# Patient Record
Sex: MALE | Race: WHITE | NOT HISPANIC OR LATINO
[De-identification: names, ages, dates, MRNs, and addresses within clinical notes are randomized per-mention and may not be internally consistent; named-entity substitution may affect disease eponyms.]

---
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
## 2019-05-08 ENCOUNTER — RECORD ABSTRACTING (OUTPATIENT)
Age: 19
End: 2019-05-08

## 2019-05-08 DIAGNOSIS — Z86.59 PERSONAL HISTORY OF OTHER MENTAL AND BEHAVIORAL DISORDERS: ICD-10-CM

## 2019-05-08 DIAGNOSIS — Z82.0 FAMILY HISTORY OF EPILEPSY AND OTHER DISEASES OF THE NERVOUS SYSTEM: ICD-10-CM

## 2019-05-20 ENCOUNTER — MEDICATION RENEWAL (OUTPATIENT)
Age: 19
End: 2019-05-20

## 2019-05-20 DIAGNOSIS — F90.2 ATTENTION-DEFICIT HYPERACTIVITY DISORDER, COMBINED TYPE: ICD-10-CM

## 2019-05-29 ENCOUNTER — APPOINTMENT (OUTPATIENT)
Dept: PEDIATRIC NEUROLOGY | Facility: CLINIC | Age: 19
End: 2019-05-29
Payer: COMMERCIAL

## 2019-05-29 VITALS
SYSTOLIC BLOOD PRESSURE: 118 MMHG | HEART RATE: 72 BPM | HEIGHT: 66.5 IN | DIASTOLIC BLOOD PRESSURE: 72 MMHG | WEIGHT: 208 LBS | BODY MASS INDEX: 33.03 KG/M2

## 2019-05-29 PROCEDURE — 99214 OFFICE O/P EST MOD 30 MIN: CPT

## 2019-05-29 NOTE — PHYSICAL EXAM
[Normal] : patient has a normal gait including toe-walking, heel-walking and tandem walking. Romberg sign is negative. [de-identified] : Baseline dysarthric speech with slow maykel

## 2019-05-29 NOTE — HISTORY OF PRESENT ILLNESS
[FreeTextEntry1] : Seen in follow up in presence of mother for ADHD. Last seen in June of last year at which point he was off of stimulant. Trialed off of medication during beginning of college but had to restart medication. Overall did well academically with his tests and projects. However, he did not complete homework in timely fashion and did not participate in some classes. So while grades were in the "As", overall average was "C" due to lack of participation and homework. He is majoring in Communication and seems to focus in Sign Language

## 2019-05-29 NOTE — ASSESSMENT
[FreeTextEntry1] : 18 year old male known history of ADHD. Doing well with medication so will continue current dose.\par \par Follow up in 6 months

## 2019-05-29 NOTE — REASON FOR VISIT
[Follow-Up Evaluation] : a follow-up evaluation for [ADHD] : ADHD [Mother] : mother [Patient] : patient

## 2019-06-17 ENCOUNTER — APPOINTMENT (OUTPATIENT)
Dept: GASTROENTEROLOGY | Facility: CLINIC | Age: 19
End: 2019-06-17
Payer: COMMERCIAL

## 2019-06-17 VITALS — DIASTOLIC BLOOD PRESSURE: 86 MMHG | HEART RATE: 62 BPM | HEIGHT: 66 IN | SYSTOLIC BLOOD PRESSURE: 132 MMHG

## 2019-06-17 DIAGNOSIS — Z87.09 PERSONAL HISTORY OF OTHER DISEASES OF THE RESPIRATORY SYSTEM: ICD-10-CM

## 2019-06-17 DIAGNOSIS — Z83.79 FAMILY HISTORY OF OTHER DISEASES OF THE DIGESTIVE SYSTEM: ICD-10-CM

## 2019-06-17 DIAGNOSIS — Z78.9 OTHER SPECIFIED HEALTH STATUS: ICD-10-CM

## 2019-06-17 PROCEDURE — 99204 OFFICE O/P NEW MOD 45 MIN: CPT

## 2019-06-17 NOTE — REASON FOR VISIT
[Initial Evaluation] : an initial evaluation [Parent] : parent [FreeTextEntry1] : chronic acid reflux for about 2 years

## 2019-06-17 NOTE — HISTORY OF PRESENT ILLNESS
[Heartburn] : heartburn worsened [Diarrhea] : denies diarrhea [Vomiting] : denies vomiting [Yellow Skin Or Eyes (Jaundice)] : denies jaundice [Constipation] : denies constipation [Rectal Pain] : denies rectal pain [Abdominal Swelling] : denies abdominal swelling [Abdominal Pain] : abdominal pain [GERD] : gastroesophageal reflux disease [Nausea] : nausea [Good Compliance] : good compliance with treatment [_________] : Performed [unfilled] [Wt Gain ___ Lbs] : no recent weight gain [Wt Loss ___ Lbs] : no recent weight loss [de-identified] : 20 y/o male patient with family hx of colon cancer (grandfather) here c/o acid reflux for the past 2 years  also c/o belching  and regurgitation. Had an upper endoscopy 2016 was using Pantoprazole with some relief.  [de-identified] : g [FreeTextEntry1] : 20 y/o male patient with family hx of colon cancer (grandfather) here c/o acid reflux for the past 2 years  also c/o belching  and regurgitation. Had an upper endoscopy 2016 was using Pantoprazole with some relief.

## 2019-06-17 NOTE — PHYSICAL EXAM
[General Appearance - Alert] : alert [Sclera] : the sclera and conjunctiva were normal [Thyroid Diffuse Enlargement] : the thyroid was not enlarged [Auscultation Breath Sounds / Voice Sounds] : lungs were clear to auscultation bilaterally [Bowel Sounds] : normal bowel sounds [Abdomen Soft] : soft [Heart Sounds] : normal S1 and S2 [Abdomen Tenderness] : non-tender [] : no hepato-splenomegaly [No CVA Tenderness] : no ~M costovertebral angle tenderness [Abdomen Mass (___ Cm)] : no abdominal mass palpated [Abnormal Walk] : normal gait [No Focal Deficits] : no focal deficits [Skin Color & Pigmentation] : normal skin color and pigmentation [Oriented To Time, Place, And Person] : oriented to person, place, and time

## 2019-06-17 NOTE — ASSESSMENT
[FreeTextEntry1] : 20 y/o male patient with family hx of colon cancer (grandfather) here c/o acid reflux for the past 2 years  also c/o belching  and regurgitation. Had an upper endoscopy 2016 was using Pantoprazole with some relief. \par \par 1- GERD\par not responsive to PPI\par Needs EGD\par Risks and benefits discussed with patient.\par

## 2019-07-01 ENCOUNTER — MEDICATION RENEWAL (OUTPATIENT)
Age: 19
End: 2019-07-01

## 2019-07-08 ENCOUNTER — MEDICATION RENEWAL (OUTPATIENT)
Age: 19
End: 2019-07-08

## 2019-08-13 ENCOUNTER — OUTPATIENT (OUTPATIENT)
Dept: OUTPATIENT SERVICES | Facility: HOSPITAL | Age: 19
LOS: 1 days | Discharge: HOME | End: 2019-08-13
Payer: COMMERCIAL

## 2019-08-13 ENCOUNTER — RESULT REVIEW (OUTPATIENT)
Age: 19
End: 2019-08-13

## 2019-08-13 ENCOUNTER — RX RENEWAL (OUTPATIENT)
Age: 19
End: 2019-08-13

## 2019-08-13 ENCOUNTER — TRANSCRIPTION ENCOUNTER (OUTPATIENT)
Age: 19
End: 2019-08-13

## 2019-08-13 VITALS
OXYGEN SATURATION: 98 % | HEIGHT: 68 IN | SYSTOLIC BLOOD PRESSURE: 127 MMHG | WEIGHT: 199.08 LBS | TEMPERATURE: 97 F | HEART RATE: 69 BPM | DIASTOLIC BLOOD PRESSURE: 69 MMHG | RESPIRATION RATE: 18 BRPM

## 2019-08-13 VITALS — SYSTOLIC BLOOD PRESSURE: 134 MMHG | RESPIRATION RATE: 15 BRPM | HEART RATE: 69 BPM | DIASTOLIC BLOOD PRESSURE: 54 MMHG

## 2019-08-13 DIAGNOSIS — Z98.890 OTHER SPECIFIED POSTPROCEDURAL STATES: Chronic | ICD-10-CM

## 2019-08-13 PROCEDURE — 88312 SPECIAL STAINS GROUP 1: CPT | Mod: 26

## 2019-08-13 PROCEDURE — 88305 TISSUE EXAM BY PATHOLOGIST: CPT | Mod: 26

## 2019-08-13 PROCEDURE — 43239 EGD BIOPSY SINGLE/MULTIPLE: CPT

## 2019-08-13 RX ORDER — DEXMETHYLPHENIDATE HYDROCHLORIDE 35 MG/1
20 CAPSULE, EXTENDED RELEASE ORAL
Qty: 0 | Refills: 0 | DISCHARGE

## 2019-08-13 NOTE — ASU DISCHARGE PLAN (ADULT/PEDIATRIC) - CARE PROVIDER_API CALL
Jayla Norwood)  Internal Medicine  [address] [place] [address]  Phone: [phone]  Fax: [phone]  Follow Up Time:

## 2019-08-16 LAB — SURGICAL PATHOLOGY STUDY: SIGNIFICANT CHANGE UP

## 2019-08-20 DIAGNOSIS — K21.9 GASTRO-ESOPHAGEAL REFLUX DISEASE WITHOUT ESOPHAGITIS: ICD-10-CM

## 2019-08-20 DIAGNOSIS — K20.9 ESOPHAGITIS, UNSPECIFIED: ICD-10-CM

## 2019-08-20 DIAGNOSIS — K29.50 UNSPECIFIED CHRONIC GASTRITIS WITHOUT BLEEDING: ICD-10-CM

## 2019-10-25 PROBLEM — F90.9 ATTENTION-DEFICIT HYPERACTIVITY DISORDER, UNSPECIFIED TYPE: Chronic | Status: ACTIVE | Noted: 2019-08-13

## 2019-10-29 ENCOUNTER — APPOINTMENT (OUTPATIENT)
Dept: PEDIATRIC NEUROLOGY | Facility: CLINIC | Age: 19
End: 2019-10-29

## 2019-10-31 ENCOUNTER — APPOINTMENT (OUTPATIENT)
Dept: GASTROENTEROLOGY | Facility: CLINIC | Age: 19
End: 2019-10-31

## 2019-11-14 ENCOUNTER — APPOINTMENT (OUTPATIENT)
Dept: GASTROENTEROLOGY | Facility: CLINIC | Age: 19
End: 2019-11-14

## 2019-12-03 ENCOUNTER — OUTPATIENT (OUTPATIENT)
Dept: OUTPATIENT SERVICES | Facility: HOSPITAL | Age: 19
LOS: 1 days | Discharge: HOME | End: 2019-12-03

## 2019-12-03 DIAGNOSIS — Z98.890 OTHER SPECIFIED POSTPROCEDURAL STATES: Chronic | ICD-10-CM

## 2019-12-04 DIAGNOSIS — R50.9 FEVER, UNSPECIFIED: ICD-10-CM

## 2020-09-23 ENCOUNTER — APPOINTMENT (OUTPATIENT)
Dept: PEDIATRIC NEUROLOGY | Facility: CLINIC | Age: 20
End: 2020-09-23
Payer: COMMERCIAL

## 2020-09-23 VITALS
HEART RATE: 60 BPM | HEIGHT: 67 IN | DIASTOLIC BLOOD PRESSURE: 74 MMHG | BODY MASS INDEX: 32.49 KG/M2 | OXYGEN SATURATION: 98 % | SYSTOLIC BLOOD PRESSURE: 116 MMHG | WEIGHT: 207 LBS

## 2020-09-23 PROCEDURE — 99214 OFFICE O/P EST MOD 30 MIN: CPT

## 2020-09-23 RX ORDER — DEXMETHYLPHENIDATE HYDROCHLORIDE 20 MG/1
20 CAPSULE, EXTENDED RELEASE ORAL DAILY
Qty: 30 | Refills: 0 | Status: DISCONTINUED | COMMUNITY
Start: 2019-07-01 | End: 2020-09-23

## 2020-09-23 RX ORDER — DEXMETHYLPHENIDATE HYDROCHLORIDE 20 MG/1
20 CAPSULE, EXTENDED RELEASE ORAL DAILY
Qty: 30 | Refills: 0 | Status: DISCONTINUED | COMMUNITY
Start: 2019-05-20 | End: 2020-09-23

## 2020-09-23 NOTE — HISTORY OF PRESENT ILLNESS
[FreeTextEntry1] : Kuldeep presents in follow-up of his ADHD.  Of note he was last seen over a year ago at which point he was taking medication.  Reportedly he self discontinued the medication at the beginning of last school year.  Also reportedly there was a decline in his academic performance but he did pass all of his classes except for English.  Since he has been back at college for the last month he is noticing a difficulty with adjusting to the online classes.  Specifically his English classes he is unable to remain organized and states that he does not have a professor present during each session.  He acknowledges being behind interning in his class work in English as well as his 2 other classes which are Astronomy and Astronomy lab.  He has not attempted to receive assistance from the tutors that are available for him.

## 2020-09-23 NOTE — PHYSICAL EXAM
[Well-appearing] : well-appearing [Normocephalic] : normocephalic [No dysmorphic facial features] : no dysmorphic facial features [No ocular abnormalities] : no ocular abnormalities [Neck supple] : neck supple [Lungs clear] : lungs clear [Heart sounds regular in rate and rhythm] : heart sounds regular in rate and rhythm [Soft] : soft [No organomegaly] : no organomegaly [No abnormal neurocutaneous stigmata or skin lesions] : no abnormal neurocutaneous stigmata or skin lesions [Straight] : straight [Alert] : alert [Well related, good eye contact] : well related, good eye contact [Conversant] : conversant [Follows instructions well] : follows instructions well [VFF] : VFF [Pupils reactive to light and accommodation] : pupils reactive to light and accommodation [Full extraocular movements] : full extraocular movements [Saccadic and smooth pursuits intact] : saccadic and smooth pursuits intact [No nystagmus] : no nystagmus [Normal facial sensation to light touch] : normal facial sensation to light touch [No facial asymmetry or weakness] : no facial asymmetry or weakness [Gross hearing intact] : gross hearing intact [Good shoulder shrug] : good shoulder shrug [Normal axial and appendicular muscle tone] : normal axial and appendicular muscle tone [Gets up on table without difficulty] : gets up on table without difficulty [Normal finger tapping and fine finger movements] : normal finger tapping and fine finger movements [No abnormal involuntary movements] : no abnormal involuntary movements [5/5 strength in proximal and distal muscles of arms and legs] : 5/5 strength in proximal and distal muscles of arms and legs [Walks and runs well] : walks and runs well [2+ biceps] : 2+ biceps [Triceps] : triceps [Knee jerks] : knee jerks [Ankle jerks] : ankle jerks [No ankle clonus] : no ankle clonus [Localizes LT and temperature] : localizes LT and temperature [No dysmetria on FTNT] : no dysmetria on FTNT [Good walking balance] : good walking balance [Normal gait] : normal gait [de-identified] : Clubbing of the fingers at baseline [de-identified] : Slow maykel of speech at baseline

## 2020-09-23 NOTE — REVIEW OF SYSTEMS
[Normal] : Hematologic/Lymphatic [Daytime Sleepiness] : daytime sleepiness [FreeTextEntry4] : Has been having fluid accumulation in the back of the throat.  Fluid pocket recently cultured and he is prophylactically on antibiotics until the cultures are resulted [de-identified] : Reports having decreased mood over the last year stemming from death of his maternal grandmother.  Was previously seeing a therapist but has not seen one in the last year.

## 2020-09-23 NOTE — ASSESSMENT
[FreeTextEntry1] : 20-year-old with known history of ADHD.  I discussed in detail that at this age he has to make more of an effort to organize his day and to plan ahead so that he is able to complete all of his course work.  He also should use the assistance available for him at school in the form of tutors and social work to help him get organized with his schoolwork.  For now I will restart him on the Focalin that he will use only on days that he is in class which are 3 days/week.  He will start at the 10 mg and if necessary increase to 20 mg in 2 weeks. I also discussed importance of good sleep hygiene as daytime sleepiness is detrimental given his baseline decreased focus.\par \par Also recommended reevaluation through cardiology given the clubbing noted on the hands.\par \par Also recommend they seek Psychiatry Assessment for his depressed mood.

## 2021-06-24 ENCOUNTER — APPOINTMENT (OUTPATIENT)
Dept: PEDIATRIC NEUROLOGY | Facility: CLINIC | Age: 21
End: 2021-06-24
Payer: COMMERCIAL

## 2021-06-24 VITALS — HEIGHT: 67 IN | WEIGHT: 211 LBS | BODY MASS INDEX: 33.12 KG/M2

## 2021-06-24 VITALS — BODY MASS INDEX: 33.12 KG/M2 | HEIGHT: 67 IN | WEIGHT: 211 LBS

## 2021-06-24 PROCEDURE — 99213 OFFICE O/P EST LOW 20 MIN: CPT

## 2021-06-24 RX ORDER — DEXMETHYLPHENIDATE HYDROCHLORIDE 10 MG/1
10 CAPSULE, EXTENDED RELEASE ORAL
Qty: 30 | Refills: 0 | Status: COMPLETED | COMMUNITY
Start: 2019-07-08 | End: 2021-07-24

## 2021-06-24 NOTE — HISTORY OF PRESENT ILLNESS
[FreeTextEntry1] : Kuldeep has had a point where he only requires stimulants when he has projects or exams to complete.  He does find it helpful to focus and to have recall information.  He is not experiencing any side effects.

## 2021-06-24 NOTE — PHYSICAL EXAM
[Well-appearing] : well-appearing [Normocephalic] : normocephalic [No dysmorphic facial features] : no dysmorphic facial features [No ocular abnormalities] : no ocular abnormalities [Neck supple] : neck supple [Lungs clear] : lungs clear [Heart sounds regular in rate and rhythm] : heart sounds regular in rate and rhythm [Soft] : soft [No abnormal neurocutaneous stigmata or skin lesions] : no abnormal neurocutaneous stigmata or skin lesions [Straight] : straight [No deformities] : no deformities [Alert] : alert [Well related, good eye contact] : well related, good eye contact [Conversant] : conversant [Follows instructions well] : follows instructions well [Pupils reactive to light and accommodation] : pupils reactive to light and accommodation [Full extraocular movements] : full extraocular movements [Saccadic and smooth pursuits intact] : saccadic and smooth pursuits intact [No nystagmus] : no nystagmus [Normal facial sensation to light touch] : normal facial sensation to light touch [No facial asymmetry or weakness] : no facial asymmetry or weakness [Gross hearing intact] : gross hearing intact [Good shoulder shrug] : good shoulder shrug [Normal axial and appendicular muscle tone] : normal axial and appendicular muscle tone [Gets up on table without difficulty] : gets up on table without difficulty [No abnormal involuntary movements] : no abnormal involuntary movements [5/5 strength in proximal and distal muscles of arms and legs] : 5/5 strength in proximal and distal muscles of arms and legs [Walks and runs well] : walks and runs well [2+ biceps] : 2+ biceps [Triceps] : triceps [Knee jerks] : knee jerks [Ankle jerks] : ankle jerks [No ankle clonus] : no ankle clonus [Localizes LT and temperature] : localizes LT and temperature [Good walking balance] : good walking balance [Normal gait] : normal gait [de-identified] : Slow maykel at baseline

## 2022-03-07 ENCOUNTER — APPOINTMENT (OUTPATIENT)
Dept: PEDIATRIC NEUROLOGY | Facility: CLINIC | Age: 22
End: 2022-03-07
Payer: COMMERCIAL

## 2022-03-07 VITALS
HEART RATE: 76 BPM | BODY MASS INDEX: 34.72 KG/M2 | HEIGHT: 66 IN | WEIGHT: 216 LBS | SYSTOLIC BLOOD PRESSURE: 131 MMHG | DIASTOLIC BLOOD PRESSURE: 82 MMHG

## 2022-03-07 PROCEDURE — 99214 OFFICE O/P EST MOD 30 MIN: CPT

## 2022-03-07 RX ORDER — FAMOTIDINE 40 MG/1
40 TABLET, FILM COATED ORAL
Qty: 30 | Refills: 6 | Status: DISCONTINUED | COMMUNITY
Start: 2019-08-13 | End: 2022-03-07

## 2022-03-07 RX ORDER — PANTOPRAZOLE 40 MG/1
40 TABLET, DELAYED RELEASE ORAL DAILY
Qty: 30 | Refills: 6 | Status: DISCONTINUED | COMMUNITY
Start: 2019-08-13 | End: 2022-03-07

## 2022-03-07 NOTE — PHYSICAL EXAM
[Well-appearing] : well-appearing [Normocephalic] : normocephalic [No dysmorphic facial features] : no dysmorphic facial features [No ocular abnormalities] : no ocular abnormalities [Neck supple] : neck supple [Lungs clear] : lungs clear [Heart sounds regular in rate and rhythm] : heart sounds regular in rate and rhythm [Soft] : soft [No organomegaly] : no organomegaly [No abnormal neurocutaneous stigmata or skin lesions] : no abnormal neurocutaneous stigmata or skin lesions [Straight] : straight [No eneida or dimples] : no eneida or dimples [Alert] : alert [Well related, good eye contact] : well related, good eye contact [Conversant] : conversant [Follows instructions well] : follows instructions well [VFF] : VFF [Pupils reactive to light and accommodation] : pupils reactive to light and accommodation [Full extraocular movements] : full extraocular movements [Saccadic and smooth pursuits intact] : saccadic and smooth pursuits intact [No nystagmus] : no nystagmus [Normal facial sensation to light touch] : normal facial sensation to light touch [No facial asymmetry or weakness] : no facial asymmetry or weakness [Gross hearing intact] : gross hearing intact [Good shoulder shrug] : good shoulder shrug [Normal axial and appendicular muscle tone] : normal axial and appendicular muscle tone [Gets up on table without difficulty] : gets up on table without difficulty [No abnormal involuntary movements] : no abnormal involuntary movements [5/5 strength in proximal and distal muscles of arms and legs] : 5/5 strength in proximal and distal muscles of arms and legs [Walks and runs well] : walks and runs well [2+ biceps] : 2+ biceps [Triceps] : triceps [Knee jerks] : knee jerks [Ankle jerks] : ankle jerks [Localizes LT and temperature] : localizes LT and temperature [Good walking balance] : good walking balance [Normal gait] : normal gait [de-identified] : Clubbing of fingers [de-identified] : Baseline slow maykel of speech

## 2022-03-07 NOTE — HISTORY OF PRESENT ILLNESS
[FreeTextEntry1] : Kuldeep presents in follow up of his ADHD. He has been off of stimulant treatment for the better part of the last year. He is no longer in College, they state he was "kicked out" for refusing to get COVID vaccine. He states he was unaware of alternatives to classroom until it was too late. He has spent the last few months trying to find a job. He states he has been anxious and depressed because of this. He reports having difficulty sleeping as well. He has still been able to socialize ans shares stories about fun outtings recently had with family in [place]. He currently has no concrete plans for returning to school.

## 2022-03-07 NOTE — ASSESSMENT
[FreeTextEntry1] : 21 year old history of ADHD currently with complaints possibly secondary to coping disorder. I recommend evaluation with Adult Psychiatry at this point. I will follow up in office in few months to confirm transition of care and discussed with them that he should transition to adult care at this point.\par \par Regarding clubbing seen on exam, I recommend assessment through Cardiology

## 2022-06-14 ENCOUNTER — NON-APPOINTMENT (OUTPATIENT)
Age: 22
End: 2022-06-14

## 2022-06-14 ENCOUNTER — APPOINTMENT (OUTPATIENT)
Dept: CARDIOLOGY | Facility: CLINIC | Age: 22
End: 2022-06-14
Payer: MEDICAID

## 2022-06-14 VITALS
RESPIRATION RATE: 16 BRPM | OXYGEN SATURATION: 98 % | SYSTOLIC BLOOD PRESSURE: 120 MMHG | TEMPERATURE: 97 F | DIASTOLIC BLOOD PRESSURE: 80 MMHG | BODY MASS INDEX: 34.23 KG/M2 | HEIGHT: 66 IN | WEIGHT: 213 LBS | HEART RATE: 72 BPM

## 2022-06-14 DIAGNOSIS — R06.09 OTHER FORMS OF DYSPNEA: ICD-10-CM

## 2022-06-14 DIAGNOSIS — Z87.898 PERSONAL HISTORY OF OTHER SPECIFIED CONDITIONS: ICD-10-CM

## 2022-06-14 PROCEDURE — 99204 OFFICE O/P NEW MOD 45 MIN: CPT

## 2022-06-14 PROCEDURE — 93000 ELECTROCARDIOGRAM COMPLETE: CPT

## 2022-06-14 NOTE — HISTORY OF PRESENT ILLNESS
[FreeTextEntry1] : 21 yo young male with pmhx as below is here for palp eval\par last few months on anf palpitations\par + lightheadedness and sob\par no cp or loc\par + fhx of cad\par non smoker\par et is stable

## 2022-06-14 NOTE — ASSESSMENT
[FreeTextEntry1] : 21 yo male with pmhx and presentation as above\par abn ecg\par palpitations, r/out arrhythmia\par 2d echo\par EST\par MCOT\par Labs\par avoid caffeine cont products\par aggressive risk modif\par heart healthy diet and act as tolerated\par rtc for testing pending ins auth

## 2022-06-16 ENCOUNTER — APPOINTMENT (OUTPATIENT)
Dept: CARDIOLOGY | Facility: CLINIC | Age: 22
End: 2022-06-16
Payer: MEDICAID

## 2022-06-16 DIAGNOSIS — R42 DIZZINESS AND GIDDINESS: ICD-10-CM

## 2022-06-16 DIAGNOSIS — R00.2 PALPITATIONS: ICD-10-CM

## 2022-06-16 DIAGNOSIS — R94.31 ABNORMAL ELECTROCARDIOGRAM [ECG] [EKG]: ICD-10-CM

## 2022-06-16 PROCEDURE — 93015 CV STRESS TEST SUPVJ I&R: CPT

## 2022-06-16 PROCEDURE — 93306 TTE W/DOPPLER COMPLETE: CPT

## 2022-06-17 PROBLEM — R00.2 INTERMITTENT PALPITATIONS: Status: ACTIVE | Noted: 2022-06-14

## 2022-06-17 PROBLEM — R42 LIGHT HEADED: Status: ACTIVE | Noted: 2022-06-14

## 2022-06-17 PROBLEM — R94.31 ABNORMAL EKG: Status: ACTIVE | Noted: 2022-06-14

## 2022-06-28 ENCOUNTER — APPOINTMENT (OUTPATIENT)
Dept: CARDIOLOGY | Facility: CLINIC | Age: 22
End: 2022-06-28

## 2022-07-12 LAB
ALBUMIN SERPL ELPH-MCNC: 4.9 G/DL
ALP BLD-CCNC: 74 U/L
ALT SERPL-CCNC: 40 U/L
ANION GAP SERPL CALC-SCNC: 14 MMOL/L
AST SERPL-CCNC: 20 U/L
BASOPHILS # BLD AUTO: 0.06 K/UL
BASOPHILS NFR BLD AUTO: 0.9 %
BILIRUB SERPL-MCNC: 0.4 MG/DL
BUN SERPL-MCNC: 10 MG/DL
CALCIUM SERPL-MCNC: 9.8 MG/DL
CHLORIDE SERPL-SCNC: 101 MMOL/L
CHOLEST SERPL-MCNC: 216 MG/DL
CK SERPL-CCNC: 147 U/L
CO2 SERPL-SCNC: 24 MMOL/L
CREAT SERPL-MCNC: 1 MG/DL
EGFR: 108 ML/MIN/1.73M2
EOSINOPHIL # BLD AUTO: 0.25 K/UL
EOSINOPHIL NFR BLD AUTO: 3.9 %
GLUCOSE SERPL-MCNC: 97 MG/DL
HCT VFR BLD CALC: 48.1 %
HDLC SERPL-MCNC: 39 MG/DL
HGB BLD-MCNC: 15.5 G/DL
IMM GRANULOCYTES NFR BLD AUTO: 0.3 %
LDLC SERPL CALC-MCNC: 147 MG/DL
LYMPHOCYTES # BLD AUTO: 2.42 K/UL
LYMPHOCYTES NFR BLD AUTO: 37.9 %
MAN DIFF?: NORMAL
MCHC RBC-ENTMCNC: 28 PG
MCHC RBC-ENTMCNC: 32.2 G/DL
MCV RBC AUTO: 87 FL
MONOCYTES # BLD AUTO: 0.63 K/UL
MONOCYTES NFR BLD AUTO: 9.9 %
NEUTROPHILS # BLD AUTO: 3 K/UL
NEUTROPHILS NFR BLD AUTO: 47.1 %
NONHDLC SERPL-MCNC: 177 MG/DL
PLATELET # BLD AUTO: 261 K/UL
POTASSIUM SERPL-SCNC: 3.9 MMOL/L
PROT SERPL-MCNC: 7.5 G/DL
RBC # BLD: 5.53 M/UL
RBC # FLD: 12.9 %
SODIUM SERPL-SCNC: 139 MMOL/L
TRIGL SERPL-MCNC: 150 MG/DL
TSH SERPL-ACNC: 2.76 UIU/ML
WBC # FLD AUTO: 6.38 K/UL

## 2022-08-17 ENCOUNTER — RESULT REVIEW (OUTPATIENT)
Age: 22
End: 2022-08-17

## 2022-08-17 ENCOUNTER — APPOINTMENT (OUTPATIENT)
Age: 22
End: 2022-08-17

## 2022-08-17 ENCOUNTER — OUTPATIENT (OUTPATIENT)
Dept: OUTPATIENT SERVICES | Facility: HOSPITAL | Age: 22
LOS: 1 days | Discharge: HOME | End: 2022-08-17

## 2022-08-17 VITALS
HEART RATE: 79 BPM | DIASTOLIC BLOOD PRESSURE: 70 MMHG | BODY MASS INDEX: 34.21 KG/M2 | SYSTOLIC BLOOD PRESSURE: 140 MMHG | WEIGHT: 218 LBS | RESPIRATION RATE: 12 BRPM | HEIGHT: 67 IN | OXYGEN SATURATION: 97 %

## 2022-08-17 DIAGNOSIS — R06.00 DYSPNEA, UNSPECIFIED: ICD-10-CM

## 2022-08-17 DIAGNOSIS — Z87.19 PERSONAL HISTORY OF OTHER DISEASES OF THE DIGESTIVE SYSTEM: ICD-10-CM

## 2022-08-17 DIAGNOSIS — Z98.890 OTHER SPECIFIED POSTPROCEDURAL STATES: Chronic | ICD-10-CM

## 2022-08-17 DIAGNOSIS — Z80.0 FAMILY HISTORY OF MALIGNANT NEOPLASM OF DIGESTIVE ORGANS: ICD-10-CM

## 2022-08-17 DIAGNOSIS — R05.8 OTHER SPECIFIED COUGH: ICD-10-CM

## 2022-08-17 DIAGNOSIS — F90.0 ATTENTION-DEFICIT HYPERACTIVITY DISORDER, PREDOMINANTLY INATTENTIVE TYPE: ICD-10-CM

## 2022-08-17 DIAGNOSIS — H93.25 CENTRAL AUDITORY PROCESSING DISORDER: ICD-10-CM

## 2022-08-17 DIAGNOSIS — Z82.49 FAMILY HISTORY OF ISCHEMIC HEART DISEASE AND OTHER DISEASES OF THE CIRCULATORY SYSTEM: ICD-10-CM

## 2022-08-17 PROCEDURE — 99204 OFFICE O/P NEW MOD 45 MIN: CPT

## 2022-08-17 PROCEDURE — 71046 X-RAY EXAM CHEST 2 VIEWS: CPT | Mod: 26

## 2022-09-08 ENCOUNTER — APPOINTMENT (OUTPATIENT)
Dept: GASTROENTEROLOGY | Facility: CLINIC | Age: 22
End: 2022-09-08

## 2022-09-08 VITALS — HEIGHT: 67 IN | BODY MASS INDEX: 33.27 KG/M2 | WEIGHT: 212 LBS

## 2022-09-08 DIAGNOSIS — Z78.9 OTHER SPECIFIED HEALTH STATUS: ICD-10-CM

## 2022-09-08 DIAGNOSIS — K21.9 GASTRO-ESOPHAGEAL REFLUX DISEASE W/OUT ESOPHAGITIS: ICD-10-CM

## 2022-09-08 PROCEDURE — 99214 OFFICE O/P EST MOD 30 MIN: CPT

## 2022-09-08 RX ORDER — ESOMEPRAZOLE MAGNESIUM 40 MG/1
40 CAPSULE, DELAYED RELEASE ORAL DAILY
Qty: 90 | Refills: 1 | Status: ACTIVE | COMMUNITY
Start: 2022-09-08 | End: 1900-01-01

## 2022-09-08 RX ORDER — ESOMEPRAZOLE MAGNESIUM 40 MG/1
40 GRANULE, DELAYED RELEASE ORAL
Refills: 0 | Status: DISCONTINUED | COMMUNITY
End: 2022-09-08

## 2022-09-08 NOTE — HISTORY OF PRESENT ILLNESS
[_________] : Performed [unfilled] [Heartburn] : stable heartburn [Nausea] : denies nausea [Vomiting] : denies vomiting [Diarrhea] : denies diarrhea [Constipation] : denies constipation [Yellow Skin Or Eyes (Jaundice)] : denies jaundice [Abdominal Pain] : denies abdominal pain [Abdominal Swelling] : denies abdominal swelling [Rectal Pain] : denies rectal pain [Wt Gain ___ Lbs] : no recent weight gain [Wt Loss ___ Lbs] : no recent weight loss [GERD] : gastroesophageal reflux disease [de-identified] : 22-year-old man with history of ADHD, obesity, chronic GERD, here to establish care and follow-up on GERD.\par Patient is known to our clinic and was seen a few years ago for worsening GERD symptoms and underwent EGD in August 2019 that showed mild reflux esophagitis.\par \par He has been maintained on Nexium 40 mg daily that he takes 30 minutes before breakfast with good control of his symptoms.\par However, for little over than a month, the patient developed new onset cough that is worse in the morning and is self-limited.  He saw his PCP and was treated for postnasal drip with minimal improvement.\par He was then seen by pulmonary and prescribed fluticasone and azelastine with some improvement of symptoms.  He is pending a sleep study.\par \par Otherwise, patient reports eating spicy foods and drinking soda.  He also endorses late night snacks before bedtime.\par \par Review of systems otherwise negative. [de-identified] : W/ DR. DONOVAN [FreeTextEntry1] : EGD 8/13/2019 showed mild reflux esophagitis in the cardia, nonerosive gastritis, and was otherwise unremarkable.\par Biopsies from the GE junction showed reactive changes and rare intraepithelial eosinophils suggestive of reflux.  Gastric and duodenal biopsies were unremarkable.

## 2022-09-08 NOTE — ASSESSMENT
[FreeTextEntry1] : 22-year-old man with history of ADHD, obesity, chronic GERD, here to establish care and follow-up on GERD.\par Denies heartburn or other GERD symptoms however, has a new onset cough for about a month that mildly improved with nasal sprays but did not resolve.\par \par #Chronic GERD\par #New onset dry cough\par Patient's cough could be from GERD in the setting of noncompliance with GERD reducing lifestyle habits as the patient eats GERD inducing foods and has late night snacks\par \par -Educated patient and his mother about importance of lifestyle modifications including weight loss and dietary modifications, avoiding spicy foods, red sauce, sodas, fried food, chocolate, mint, excessive coffee, and late night meals and snacks\par -Continue Nexium 40 mg but switch to p.m. dosing to be taken 30-60 minutes before dinner\par -No indication for endoscopic evaluation given recent EGD in 2019\par -workup with pulmonary as planned and follow-up as scheduled\par -Follow-up in 3 months

## 2022-09-08 NOTE — PHYSICAL EXAM
[General Appearance - Alert] : alert [General Appearance - In No Acute Distress] : in no acute distress [Sclera] : the sclera and conjunctiva were normal [PERRL With Normal Accommodation] : pupils were equal in size, round, and reactive to light [Extraocular Movements] : extraocular movements were intact [Neck Appearance] : the appearance of the neck was normal [] : no respiratory distress [Respiration, Rhythm And Depth] : normal respiratory rhythm and effort [Exaggerated Use Of Accessory Muscles For Inspiration] : no accessory muscle use [Apical Impulse] : the apical impulse was normal [Heart Rate And Rhythm] : heart rate was normal and rhythm regular [Heart Sounds] : normal S1 and S2 [Edema] : there was no peripheral edema [Bowel Sounds] : normal bowel sounds [Abdomen Soft] : soft [Abdomen Tenderness] : non-tender [Abnormal Walk] : normal gait [Skin Color & Pigmentation] : normal skin color and pigmentation [Skin Turgor] : normal skin turgor [No Focal Deficits] : no focal deficits [Oriented To Time, Place, And Person] : oriented to person, place, and time

## 2022-09-13 NOTE — PHYSICAL EXAM
[No Acute Distress] : no acute distress [Normal Oropharynx] : normal oropharynx [Normal Appearance] : normal appearance [Normal Rate/Rhythm] : normal rate/rhythm [No Resp Distress] : no resp distress [No Neck Mass] : no neck mass [Normal S1, S2] : normal s1, s2 [No Murmurs] : no murmurs [Clear to Auscultation Bilaterally] : clear to auscultation bilaterally [No Abnormalities] : no abnormalities [Benign] : benign [Normal Gait] : normal gait [No Clubbing] : no clubbing [No Cyanosis] : no cyanosis [No Edema] : no edema [FROM] : FROM [Normal Color/ Pigmentation] : normal color/ pigmentation [No Focal Deficits] : no focal deficits [Oriented x3] : oriented x3 [Normal Affect] : normal affect [TextBox_125] : clubbing

## 2022-09-13 NOTE — END OF VISIT
[] : Fellow [FreeTextEntry3] : EVENTS NOTED, COUGH FOR 2 WEEKS, ON PPI, PND, REPORTS JOSE SYMPTOMS, HST/ NASAL SPRAYS/ PFT, WEIGHT LOSS

## 2022-09-13 NOTE — HISTORY OF PRESENT ILLNESS
[Never] : never [TextBox_4] : pt was in usual state of health when 2 weeks ago suddenly started having cough. It is dry no know exacerbating or relieving factors. Mild tickle in throat. only allergic to pollen. h/o Gerd on nexium for three years. Does not smoke. No recent infetions. denies any gerd, post nasal drip. SAW PMD/ URGENT CARE, COUGH WORSE, NON SMOKER, NO HX OF ASTHMA\par \par REPORTS JOSE SYMPTOMS, EDS,. TIRED

## 2022-09-13 NOTE — ASSESSMENT
[FreeTextEntry1] : pt was in usual state of health when 2 weeks ago suddenly started having cough. It is dry no know exacerbating or relieving factors. Mild tickle in throat. only allergic to pollen. h/o Gerd on nexium for three years. Does not smoke. No recent infetions. denies any gerd, post nasal drip.\par \par #) Cough\par some cobble stone appearance noted oropharynx\par Nasal spray with azalestin and steroids\par PFTS\par sleep study\par \par F/U in clinic in 8 weeks

## 2022-09-20 ENCOUNTER — OUTPATIENT (OUTPATIENT)
Dept: OUTPATIENT SERVICES | Facility: HOSPITAL | Age: 22
LOS: 1 days | Discharge: HOME | End: 2022-09-20

## 2022-09-20 ENCOUNTER — APPOINTMENT (OUTPATIENT)
Dept: SLEEP CENTER | Facility: HOSPITAL | Age: 22
End: 2022-09-20

## 2022-09-20 DIAGNOSIS — Z98.890 OTHER SPECIFIED POSTPROCEDURAL STATES: Chronic | ICD-10-CM

## 2022-09-20 PROCEDURE — 95800 SLP STDY UNATTENDED: CPT | Mod: 26

## 2022-09-21 DIAGNOSIS — G47.33 OBSTRUCTIVE SLEEP APNEA (ADULT) (PEDIATRIC): ICD-10-CM

## 2022-09-22 ENCOUNTER — APPOINTMENT (OUTPATIENT)
Dept: PEDIATRIC NEUROLOGY | Facility: CLINIC | Age: 22
End: 2022-09-22

## 2022-09-22 VITALS
SYSTOLIC BLOOD PRESSURE: 128 MMHG | DIASTOLIC BLOOD PRESSURE: 77 MMHG | OXYGEN SATURATION: 99 % | TEMPERATURE: 97.8 F | HEART RATE: 71 BPM | HEIGHT: 67 IN | BODY MASS INDEX: 32.96 KG/M2 | WEIGHT: 210 LBS

## 2022-09-22 DIAGNOSIS — F90.9 ATTENTION-DEFICIT HYPERACTIVITY DISORDER, UNSPECIFIED TYPE: ICD-10-CM

## 2022-09-22 PROCEDURE — 99213 OFFICE O/P EST LOW 20 MIN: CPT

## 2022-09-22 NOTE — HISTORY OF PRESENT ILLNESS
[FreeTextEntry1] : Kuldeep presents in follow-up.  At the time of the last visit I did refer him to cardiology given some abnormality seen on his physical exam.  That assessment was completed and demonstrated normal exam.\par \par Overall he seems to be improving with his overall mood and behavior.  He does now have a new job which he takes full responsibility for as far as arriving on time and completing his duties.  He seems to be quite interested in social activities and interacting with friends.  He does get emotional at times but is not having any significant aggressive outbursts.

## 2022-09-22 NOTE — PHYSICAL EXAM
[Well-appearing] : well-appearing [Normocephalic] : normocephalic [No dysmorphic facial features] : no dysmorphic facial features [No ocular abnormalities] : no ocular abnormalities [Neck supple] : neck supple [Lungs clear] : lungs clear [Heart sounds regular in rate and rhythm] : heart sounds regular in rate and rhythm [Soft] : soft [No organomegaly] : no organomegaly [No abnormal neurocutaneous stigmata or skin lesions] : no abnormal neurocutaneous stigmata or skin lesions [No deformities] : no deformities [Alert] : alert [Well related, good eye contact] : well related, good eye contact [Conversant] : conversant [Follows instructions well] : follows instructions well [Pupils reactive to light and accommodation] : pupils reactive to light and accommodation [Full extraocular movements] : full extraocular movements [Saccadic and smooth pursuits intact] : saccadic and smooth pursuits intact [No nystagmus] : no nystagmus [Normal facial sensation to light touch] : normal facial sensation to light touch [No facial asymmetry or weakness] : no facial asymmetry or weakness [Gross hearing intact] : gross hearing intact [Good shoulder shrug] : good shoulder shrug [Normal axial and appendicular muscle tone] : normal axial and appendicular muscle tone [Gets up on table without difficulty] : gets up on table without difficulty [No pronator drift] : no pronator drift [Normal finger tapping and fine finger movements] : normal finger tapping and fine finger movements [No abnormal involuntary movements] : no abnormal involuntary movements [5/5 strength in proximal and distal muscles of arms and legs] : 5/5 strength in proximal and distal muscles of arms and legs [Walks and runs well] : walks and runs well [Able to walk on heels] : able to walk on heels [2+ biceps] : 2+ biceps [Triceps] : triceps [Knee jerks] : knee jerks [Localizes LT and temperature] : localizes LT and temperature [Good walking balance] : good walking balance [Normal gait] : normal gait [de-identified] : Baseline slow maykel of speech

## 2022-09-22 NOTE — ASSESSMENT
[FreeTextEntry1] : 22-year-old with history of ADHD who has been off of stimulant treatment for a number of years.  He appears to be coping better with his social situation and is making appropriate plans for his future.  At this point he no longer needs to maintain follow-up.  If new neurologic issues arise they will seek assistance from adult neurology and [facility].

## 2022-10-05 ENCOUNTER — APPOINTMENT (OUTPATIENT)
Age: 22
End: 2022-10-05

## 2022-10-05 DIAGNOSIS — G47.33 OBSTRUCTIVE SLEEP APNEA (ADULT) (PEDIATRIC): ICD-10-CM

## 2022-10-05 DIAGNOSIS — R05.3 CHRONIC COUGH: ICD-10-CM

## 2022-10-05 PROCEDURE — 99442: CPT

## 2022-10-05 NOTE — REASON FOR VISIT
[Home] : at home, [unfilled] , at the time of the visit. [Medical Office: ([facility])___] : at the medical office located in  [Mother] : mother [Verbal consent obtained from patient] : the patient, [unfilled] [Follow-Up] : a follow-up visit [Sleep Apnea] : sleep apnea [Cough] : cough

## 2022-10-05 NOTE — HISTORY OF PRESENT ILLNESS
[TextBox_4] : SP SLEEP STUDY MILD JOSE\par STILL COUGHING ON NASAL SPRAYS, NO WHEEZING, SOB, SAW ENT

## 2022-10-05 NOTE — DISCUSSION/SUMMARY
[FreeTextEntry1] : MILD JOSE WANT TO LOOSE WEIGHT \par COUGH/ ON NASAL SPRAYS FOR ALLERGIES TESTING \par PFT\par CXR REVIEWED

## 2022-11-16 ENCOUNTER — LABORATORY RESULT (OUTPATIENT)
Age: 22
End: 2022-11-16

## 2022-11-17 ENCOUNTER — APPOINTMENT (OUTPATIENT)
Dept: UROLOGY | Facility: CLINIC | Age: 22
End: 2022-11-17

## 2022-11-17 VITALS
BODY MASS INDEX: 34.21 KG/M2 | DIASTOLIC BLOOD PRESSURE: 69 MMHG | OXYGEN SATURATION: 98 % | SYSTOLIC BLOOD PRESSURE: 130 MMHG | TEMPERATURE: 97.2 F | HEART RATE: 77 BPM | HEIGHT: 67 IN | WEIGHT: 218 LBS | RESPIRATION RATE: 16 BRPM

## 2022-11-17 PROCEDURE — 99203 OFFICE O/P NEW LOW 30 MIN: CPT

## 2022-11-17 RX ORDER — AZELASTINE HYDROCHLORIDE 205.5 UG/1
0.15 SPRAY, METERED NASAL
Qty: 1 | Refills: 2 | Status: COMPLETED | COMMUNITY
Start: 2022-08-17 | End: 2022-11-17

## 2022-11-17 RX ORDER — FEXOFENADINE HCL 60 MG
60 TABLET ORAL
Refills: 0 | Status: ACTIVE | COMMUNITY

## 2022-11-17 RX ORDER — FLUTICASONE PROPIONATE 50 UG/1
50 SPRAY, METERED NASAL
Qty: 1 | Refills: 5 | Status: COMPLETED | COMMUNITY
Start: 2022-08-17 | End: 2022-11-17

## 2022-11-17 RX ORDER — NEOMYCIN SULFATE, POLYMYXIN B SULFATE, HYDROCORTISONE 3.5; 10000; 1 MG/ML; [USP'U]/ML; MG/ML
1 SOLUTION/ DROPS AURICULAR (OTIC)
Qty: 10 | Refills: 0 | Status: ACTIVE | COMMUNITY
Start: 2022-10-25

## 2022-11-17 NOTE — LETTER BODY
[Dear  ___] : Dear  [unfilled], [Consult Letter:] : I had the pleasure of evaluating your patient, [unfilled]. [Please see my note below.] : Please see my note below. [Consult Closing:] : Thank you very much for allowing me to participate in the care of this patient.  If you have any questions, please do not hesitate to contact me. [Sincerely,] : Sincerely, [FreeTextEntry2] : Darius Rios MD\par [address] [place] [address]\[place] [address]

## 2022-11-17 NOTE — PHYSICAL EXAM
[General Appearance - Well Developed] : well developed [General Appearance - Well Nourished] : well nourished [Normal Appearance] : normal appearance [Well Groomed] : well groomed [General Appearance - In No Acute Distress] : no acute distress [Edema] : no peripheral edema [Respiration, Rhythm And Depth] : normal respiratory rhythm and effort [Exaggerated Use Of Accessory Muscles For Inspiration] : no accessory muscle use [Abdomen Soft] : soft [Abdomen Tenderness] : non-tender [Abdomen Hernia] : no hernia was discovered [Costovertebral Angle Tenderness] : no ~M costovertebral angle tenderness [Urethral Meatus] : meatus normal [Penis Abnormality] : normal circumcised penis [Epididymis] : the epididymides were normal [Testes Tenderness] : no tenderness of the testes [Prostate Enlargement] : the prostate was not enlarged [Prostate Tenderness] : the prostate was not tender [No Prostate Nodules] : no prostate nodules [Prostate Size ___ gm] : prostate size [unfilled] gm [Normal Station and Gait] : the gait and station were normal for the patient's age [] : no rash [Oriented To Time, Place, And Person] : oriented to person, place, and time [Affect] : the affect was normal [Mood] : the mood was normal [Not Anxious] : not anxious [FreeTextEntry1] : Normal deep tendon reflexes

## 2022-11-17 NOTE — HISTORY OF PRESENT ILLNESS
[3] : 3 [Sudden] : sudden [___ Month(s) Ago] : [unfilled] month(s) ago [Mild] : mild [Improving] : improving [None] : No relieving factors are noted [FreeTextEntry1] : Kuldeep is a 22-year-old male born June 2000 who has generally normal healthy urologic life self-induced orgasm sometimes 2-3 times in 1 night without any problems.  About 2 months ago he found that at the time of orgasm he get a pinching sensation in the right testicle it did not matter whether he had just had orgasm an hour before having had orgasm in a couple of weeks it pinched.  On a scale of 1-10 it was up to a 3 at most and only lasted a couple of seconds and once the ejaculate finished the pinching sensation went away.  There has been no blood no pus no burning though there is a slight burning when he starts to urinate and he acknowledges he tends to auto dehydration.  When he washes himself in the shower there is no tenderness to the testicle. [de-identified] : Pinching in the right testicle [de-identified] : like a sting [de-identified] : whenever he ejaculates

## 2022-11-17 NOTE — ASSESSMENT
[FreeTextEntry1] : The exam other than showing some irritated skin on the shaft and by the glans, possible friction burn is normal.  There is no tenderness of the testicle to palpation there is no signs of chronic prostatitis\par \par We will check he is urine, we will have him give semen for a culture, he will come back and we will see what is happening.  In the interim I would suggest he get some sexual lubricant so that when he does masturbate he does not cause irritation of the skin just from the friction.

## 2022-11-21 LAB
APPEARANCE: CLEAR
BACTERIA UR CULT: NORMAL
BILIRUBIN URINE: NEGATIVE
BLOOD URINE: NEGATIVE
COLOR: YELLOW
GLUCOSE QUALITATIVE U: NEGATIVE
KETONES URINE: NEGATIVE
LEUKOCYTE ESTERASE URINE: NEGATIVE
NITRITE URINE: NEGATIVE
PH URINE: 6
PROTEIN URINE: ABNORMAL
SPECIFIC GRAVITY URINE: 1.03
UROBILINOGEN URINE: NORMAL

## 2022-11-23 ENCOUNTER — APPOINTMENT (OUTPATIENT)
Age: 22
End: 2022-11-23

## 2022-12-02 LAB — BACTERIA FLD CULT: ABNORMAL

## 2022-12-15 ENCOUNTER — APPOINTMENT (OUTPATIENT)
Dept: GASTROENTEROLOGY | Facility: CLINIC | Age: 22
End: 2022-12-15

## 2022-12-29 ENCOUNTER — APPOINTMENT (OUTPATIENT)
Dept: UROLOGY | Facility: CLINIC | Age: 22
End: 2022-12-29

## 2022-12-29 VITALS
HEART RATE: 83 BPM | RESPIRATION RATE: 16 BRPM | WEIGHT: 217 LBS | BODY MASS INDEX: 34.06 KG/M2 | DIASTOLIC BLOOD PRESSURE: 77 MMHG | HEIGHT: 67 IN | SYSTOLIC BLOOD PRESSURE: 138 MMHG

## 2022-12-29 DIAGNOSIS — F52.32 MALE ORGASMIC DISORDER: ICD-10-CM

## 2022-12-29 PROCEDURE — 99213 OFFICE O/P EST LOW 20 MIN: CPT

## 2022-12-29 RX ORDER — DOXYCYCLINE 100 MG/1
100 CAPSULE ORAL DAILY
Qty: 10 | Refills: 0 | Status: ACTIVE | COMMUNITY
Start: 2022-12-29 | End: 1900-01-01

## 2022-12-29 NOTE — LETTER HEADER
[FreeTextEntry3] : Raul Arriaza M.D.\par Director Emeritus of Urology\par [facility]\par [address] [place] [address], [address]\[place] [address]

## 2022-12-29 NOTE — LETTER BODY
[Dear  ___] : Dear  [unfilled], [Courtesy Letter:] : I had the pleasure of seeing your patient, [unfilled], in my office today. [Please see my note below.] : Please see my note below. [Sincerely,] : Sincerely, [FreeTextEntry2] : Darius Rios MD\par [address] [place] [address]\[place] [address]

## 2022-12-29 NOTE — ASSESSMENT
[FreeTextEntry1] : He has some staph epi etc which i think are all skin but he has sxs and i thnk a trial of doxycycline is acceptable

## 2022-12-29 NOTE — HISTORY OF PRESENT ILLNESS
[FreeTextEntry1] : Kuldeep is a 22-year-old male born June 2000 last seen on 11/17/2022 who for about 2 months ago he found that at the time of orgasm he get a pinching sensation in the right testicle it did not matter whether he had just had orgasm an hour before having had orgasm in a couple of weeks it pinched.  On a scale of 1-10 it was up to a 3 at most and only lasted a couple of seconds and once the ejaculate finished the pinching sensation went away.  There has been no blood no pus no burning though there is a slight burning when he starts to urinate and he acknowledges he tends to auto dehydration.  When he washes himself in the shower there is no tenderness to the testicle. He tells me it hasn’t changed\par \par The exam was benign except for penile skin irritation, we had him give urine and semen samples for review\par He was to try lubricant which he says helped the skin and even the right testicle discomfort is somewhat better\par \par the urine and semen sample were obtained and then left out for about 5-6 hours before going to the lab

## 2022-12-29 NOTE — PHYSICAL EXAM
[] : no respiratory distress [Respiration, Rhythm And Depth] : normal respiratory rhythm and effort [Exaggerated Use Of Accessory Muscles For Inspiration] : no accessory muscle use [Oriented To Time, Place, And Person] : oriented to person, place, and time [Affect] : the affect was normal [Mood] : the mood was normal [Not Anxious] : not anxious

## 2023-05-02 ENCOUNTER — APPOINTMENT (OUTPATIENT)
Dept: UROLOGY | Facility: CLINIC | Age: 23
End: 2023-05-02

## 2023-06-22 ENCOUNTER — APPOINTMENT (OUTPATIENT)
Dept: OPHTHALMOLOGY | Facility: CLINIC | Age: 23
End: 2023-06-22

## 2024-08-30 NOTE — ASU PREOP CHECKLIST - BSA (M2)
Bronwyn Zaidi NP in Adult Health  [address] [facility], [address] [address]  Phone: [phone]  Fax: [phone]  Follow Up Time:   
2.04